# Patient Record
Sex: FEMALE | Race: OTHER | HISPANIC OR LATINO | URBAN - METROPOLITAN AREA
[De-identification: names, ages, dates, MRNs, and addresses within clinical notes are randomized per-mention and may not be internally consistent; named-entity substitution may affect disease eponyms.]

---

## 2023-05-15 ENCOUNTER — EMERGENCY (EMERGENCY)
Facility: HOSPITAL | Age: 63
LOS: 0 days | Discharge: HOME | End: 2023-05-15
Attending: EMERGENCY MEDICINE
Payer: SELF-PAY

## 2023-05-15 VITALS
TEMPERATURE: 98 F | HEART RATE: 69 BPM | SYSTOLIC BLOOD PRESSURE: 132 MMHG | WEIGHT: 151.9 LBS | DIASTOLIC BLOOD PRESSURE: 68 MMHG | OXYGEN SATURATION: 96 % | RESPIRATION RATE: 16 BRPM | HEIGHT: 62 IN

## 2023-05-15 DIAGNOSIS — S80.01XA CONTUSION OF RIGHT KNEE, INITIAL ENCOUNTER: ICD-10-CM

## 2023-05-15 DIAGNOSIS — M25.561 PAIN IN RIGHT KNEE: ICD-10-CM
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
## 2023-05-15 DIAGNOSIS — S80.02XA CONTUSION OF LEFT KNEE, INITIAL ENCOUNTER: ICD-10-CM

## 2023-05-15 PROCEDURE — 73564 X-RAY EXAM KNEE 4 OR MORE: CPT | Mod: 26,RT

## 2023-05-15 PROCEDURE — 99284 EMERGENCY DEPT VISIT MOD MDM: CPT

## 2023-05-15 PROCEDURE — 73590 X-RAY EXAM OF LOWER LEG: CPT | Mod: 26,RT

## 2023-05-15 PROCEDURE — 73590 X-RAY EXAM OF LOWER LEG: CPT | Mod: RT

## 2023-05-15 PROCEDURE — 99284 EMERGENCY DEPT VISIT MOD MDM: CPT | Mod: 25

## 2023-05-15 PROCEDURE — 73564 X-RAY EXAM KNEE 4 OR MORE: CPT | Mod: RT

## 2023-05-15 PROCEDURE — 96372 THER/PROPH/DIAG INJ SC/IM: CPT

## 2023-05-15 RX ORDER — KETOROLAC TROMETHAMINE 30 MG/ML
15 SYRINGE (ML) INJECTION ONCE
Refills: 0 | Status: DISCONTINUED | OUTPATIENT
Start: 2023-05-15 | End: 2023-05-15

## 2023-05-15 RX ADMIN — Medication 15 MILLIGRAM(S): at 18:24

## 2023-05-15 NOTE — ED PROVIDER NOTE - OBJECTIVE STATEMENT
64 yo f no sig pmh presents with RT knee pain. Pt had mechanical fall on to RT knee hours pta. Denies head trauma, LOC, numbness/tingling, ho of previous knee injury. Has been able to bear weight minimally with pain. Denies laceration  Accompanied by son

## 2023-05-15 NOTE — ED PROVIDER NOTE - CLINICAL SUMMARY MEDICAL DECISION MAKING FREE TEXT BOX
Patient presented with right knee pain as documented status post fall PTA.  Has been able to bear weight on the knee.  Otherwise on arrival patient afebrile, hemodynamically stable, neurovascular intact.  X-ray obtained and negative for acute fracture or dislocation and pain otherwise controlled with Toradol.  Given the above, will discharge home with outpatient follow up. Patient agreeable with plan. Agrees to return to ED for any new or worsening symptoms.

## 2023-05-15 NOTE — ED PROVIDER NOTE - PRINCIPAL DIAGNOSIS
HISTORY OF PRESENT ILLNESS  Liset Martinez is a 38 y.o. female. HPI: New patient comes in to get established and complaining of discoloration on left side of her face x 1 year. She reports that it is raised and is getting bigger. She also reports it bled after scratching few days ago. History reviewed. No pertinent past medical history. Past Surgical History:   Procedure Laterality Date    HX HEENT  2015    Both eyes   No Known Allergies  No current outpatient medications on file. Review of Systems   Constitutional: Negative. Respiratory: Negative. Cardiovascular: Negative. Gastrointestinal: Negative. Blood pressure 126/88, pulse 84, temperature 97.8 °F (36.6 °C), temperature source Oral, resp. rate 16, height 5' 7\" (1.702 m), weight 167 lb (75.8 kg), SpO2 98 %. Physical Exam   Constitutional: No distress. HENT:   Mouth/Throat: Oropharynx is clear and moist.   Neck: Normal range of motion. Neck supple. Cardiovascular: Normal rate and regular rhythm. No murmur heard. Pulmonary/Chest: Effort normal and breath sounds normal.   Abdominal: Soft. Bowel sounds are normal.   Skin:   Small flat beige lesion in left side of her face,     Nursing note and vitals reviewed. ASSESSMENT and PLAN  Diagnoses and all orders for this visit:    1.  Skin lesion  -     REFERRAL TO DERMATOLOGY  Pt was given an after visit summary which includes diagnosis, current medicines and vital and voiced understanding of treatment plan Contusion, knee

## 2023-05-15 NOTE — ED PROVIDER NOTE - PHYSICAL EXAMINATION
CONSTITUTIONAL: NAD  SKIN: Warm dry  EXT: no pedal edema, no laceration/abrasion/echymossis to bl knee  FROM passive, limited active ROM 2/2 pain, pain with extension, no ankle pain, hip stable

## 2023-05-15 NOTE — ED PROVIDER NOTE - NSFOLLOWUPINSTRUCTIONS_ED_ALL_ED_FT
Crutch Instructions    WHAT YOU NEED TO KNOW:    Crutches are tools that provide support and balance when you walk. You may need 1 or 2 crutches to help support your body weight. You may need crutches if you had surgery or an injury that affects your ability to walk.    DISCHARGE INSTRUCTIONS:    How to use crutches safely:   •Support your weight with your arms and hands. Do not support your weight with your armpits. This could hurt the nerves that are in your underarms. Keep your elbow bent when the crutch is in place under your arm.      •Walk slowly and carefully with crutches. Go up and down stairs and ramps slowly, and stop to rest when you feel tired. Get up slowly to a sitting or standing position. This will help prevent dizziness and fainting. Use your crutches only on firm ground. Use caution when you walk on ice or snow. Wet or waxed floors and smooth cement floors can be slippery. Watch out for small rugs or cords.       How to walk with crutches:   •Place both crutches under your arms, and place your hands on the hand  of the crutches. Place your crutches slightly in front of you.       •The top of the crutches should be about 2 fingers [id number] (about 1½ inches) below your armpits. Place your weight on your hands. The top of the crutches should not press into your armpits.      •If you have one leg that is injured, keep it off the floor by bending your knee.      •Lift the crutches and move them a step ahead of you. Put the rubber ends of the crutches firmly on the ground. Move the foot that is not injured between the crutches. Place that heel down first.      •If you are using your crutches for balance, move your right foot and left crutch forward. Then move your left foot and right crutch forward. Keep walking this way.  Walking with Crutches           How to go up stairs with crutches:   •Face the stairs. Put the crutches close to the first step.      •Push onto the crutches and put your uninjured leg on the first step.      •Put your weight on your uninjured leg that is on the first step. Bring both crutches and the injured leg onto the step at the same time.      •When you hold onto a railing with one arm, put both crutches under the other arm. Use the railing to help you go up stairs.   Crutches Going Upstairs With Crutches           How to go down stairs with crutches:   •Stand with the toes of your uninjured leg close to the edge of the step.      •Bend the knee of your uninjured leg. Slowly lower both crutches along with the injured leg onto the next step.       •Lean on your crutches. Slowly lower your uninjured leg onto the same step.      •Place both crutches under one arm while you hold onto the railing with the other arm.  Crutches Going Downstairs with Crutches           How to sit in a chair with crutches:   •Turn and back up to the chair until you feel the edge of it against the back of your legs. Keep your injured leg forward.      •Take your crutches out from under your arms. Sit while bending your uninjured knee.  Crutches Sitting Down with Crutches           How to get up from a chair with crutches:   •Sit on the edge of your chair.       •Push up with your hands using the crutches or arms of the chair. Put your weight on your uninjured foot as you get up.      •Keep your injured leg bent at the knee and off the floor.  Crutches Standing Up with Crutches           Contact your healthcare provider if:   •Your crutches do not fit.      •One crutch is longer than the other.      •Your crutches break or get lost.      •The rubber tips of your crutches are split or loose.      •You get blisters or painful calluses on your hands or armpits.      •Your armpit is red, sore, or has bumps or pimples.       •Your arm muscles get weaker the longer you use the crutches.      •You have questions or concerns about your condition or care.      Return to the emergency department if:   •You have sudden numbness in a hand or arm.      •Your fingers feel cold or have cramping pain.          © Copyright [employer] 2021       ~~~  Knee Pain    Knee pain is a very common symptom and can have many causes. Knee pain often goes away when you follow your health care provider's instructions for relieving pain and discomfort at home. However, knee pain can develop into a condition that needs treatment. Some conditions may include:     Arthritis caused by wear and tear (osteoarthritis).  Arthritis caused by swelling and irritation (rheumatoid arthritis or gout).  A cyst or growth in your knee.  An infection in your knee joint.  An injury that will not heal.  Damage, swelling, or irritation of the tissues that support your knee (torn ligaments or tendinitis).    If your knee pain continues, additional tests may be ordered to diagnose your condition. Tests may include X-rays or other imaging studies of your knee. You may also need to have fluid removed from your knee. Treatment for ongoing knee pain depends on the cause, but treatment may include:    Medicines to relieve pain or swelling.  Steroid injections in your knee.  Physical therapy.  Surgery.    HOME CARE INSTRUCTIONS  Take medicines only as directed by your health care provider.   Rest your knee and keep it raised (elevated) while you are resting.  Do not do things that cause or worsen pain.  Avoid high-impact activities or exercises, such as running, jumping rope, or doing jumping jacks.  Apply ice to the knee area:  Put ice in a plastic bag.  Place a towel between your skin and the bag.  Leave the ice on for 20 minutes, 2–3 times a day.  Ask your health care provider if you should wear an elastic knee support.  Keep a pillow under your knee when you sleep.  Lose weight if you are overweight. Extra weight can put pressure on your knee.  Do not use any tobacco products, including cigarettes, chewing tobacco, or electronic cigarettes. If you need help quitting, ask your health care provider. Smoking may slow the healing of any bone and joint problems that you may have.    SEEK MEDICAL CARE IF:  Your knee pain continues, changes, or gets worse.  You have a fever along with knee pain.  Your knee eleanor or locks up.  Your knee becomes more swollen.    SEEK IMMEDIATE MEDICAL CARE IF:  Your knee joint feels hot to the touch.  You have chest pain or trouble breathing.

## 2023-05-15 NOTE — ED PROVIDER NOTE - PATIENT PORTAL LINK FT
You can access the FollowMyHealth Patient Portal offered by [facility] by registering at the following website: [email or web address]. By joining [employer]’s FollowMyHealth portal, you will also be able to view your health information using other applications (apps) compatible with our system.

## 2023-05-15 NOTE — ED PROVIDER NOTE - NSFOLLOWUPCLINICS_GEN_ALL_ED_FT
[facility]  Physical Therapy  Multiple Location  [place]   Phone: [phone]  Fax:     [facility]  Orthpedic  [address], [place]   Phone: [phone]  Fax:   Follow Up Time: 1-3 Days